# Patient Record
Sex: FEMALE | Race: WHITE | NOT HISPANIC OR LATINO | URBAN - METROPOLITAN AREA
[De-identification: names, ages, dates, MRNs, and addresses within clinical notes are randomized per-mention and may not be internally consistent; named-entity substitution may affect disease eponyms.]

---

## 2019-02-10 ENCOUNTER — HOSPITAL ENCOUNTER (EMERGENCY)
Facility: HOSPITAL | Age: 34
Discharge: HOME | End: 2019-02-10
Attending: EMERGENCY MEDICINE
Payer: OTHER GOVERNMENT

## 2019-02-10 VITALS
WEIGHT: 168 LBS | BODY MASS INDEX: 28.68 KG/M2 | DIASTOLIC BLOOD PRESSURE: 85 MMHG | SYSTOLIC BLOOD PRESSURE: 132 MMHG | TEMPERATURE: 98 F | RESPIRATION RATE: 18 BRPM | HEIGHT: 64 IN | HEART RATE: 90 BPM | OXYGEN SATURATION: 98 %

## 2019-02-10 DIAGNOSIS — R50.9 ACUTE FEBRILE ILLNESS: Primary | ICD-10-CM

## 2019-02-10 DIAGNOSIS — R05.9 COUGH: ICD-10-CM

## 2019-02-10 DIAGNOSIS — J11.1 INFLUENZA-LIKE ILLNESS: ICD-10-CM

## 2019-02-10 LAB
B-HCG UR QL: NEGATIVE
BILIRUB UR QL STRIP: NEGATIVE
CLARITY UR: CLEAR
COLOR UR: YELLOW
CTP QC/QA: YES
CTP QC/QA: YES
DEPRECATED S PYO AG THROAT QL EIA: NEGATIVE
FLUAV AG NPH QL: NEGATIVE
FLUBV AG NPH QL: NEGATIVE
GLUCOSE UR QL STRIP: NEGATIVE
HGB UR QL STRIP: NEGATIVE
KETONES UR QL STRIP: NEGATIVE
LEUKOCYTE ESTERASE UR QL STRIP: NEGATIVE
NITRITE UR QL STRIP: NEGATIVE
PH UR STRIP: 5 [PH] (ref 5–8)
PROT UR QL STRIP: NEGATIVE
SP GR UR STRIP: 1.02 (ref 1–1.03)
URN SPEC COLLECT METH UR: NORMAL
UROBILINOGEN UR STRIP-ACNC: NEGATIVE EU/DL

## 2019-02-10 PROCEDURE — 93005 ELECTROCARDIOGRAM TRACING: CPT

## 2019-02-10 PROCEDURE — 93010 ELECTROCARDIOGRAM REPORT: CPT | Mod: ,,, | Performed by: INTERNAL MEDICINE

## 2019-02-10 PROCEDURE — 93010 EKG 12-LEAD: ICD-10-PCS | Mod: ,,, | Performed by: INTERNAL MEDICINE

## 2019-02-10 PROCEDURE — 81003 URINALYSIS AUTO W/O SCOPE: CPT

## 2019-02-10 PROCEDURE — 81025 URINE PREGNANCY TEST: CPT | Performed by: NURSE PRACTITIONER

## 2019-02-10 PROCEDURE — 99285 EMERGENCY DEPT VISIT HI MDM: CPT

## 2019-02-10 PROCEDURE — 25000003 PHARM REV CODE 250: Performed by: NURSE PRACTITIONER

## 2019-02-10 PROCEDURE — 87081 CULTURE SCREEN ONLY: CPT

## 2019-02-10 PROCEDURE — 87880 STREP A ASSAY W/OPTIC: CPT

## 2019-02-10 RX ORDER — BENZONATATE 100 MG/1
100 CAPSULE ORAL 3 TIMES DAILY PRN
Qty: 15 CAPSULE | Refills: 0 | Status: SHIPPED | OUTPATIENT
Start: 2019-02-10 | End: 2019-02-20

## 2019-02-10 RX ORDER — IBUPROFEN 800 MG/1
800 TABLET ORAL 3 TIMES DAILY
COMMUNITY

## 2019-02-10 RX ORDER — CETIRIZINE HYDROCHLORIDE 10 MG/1
10 TABLET ORAL DAILY
COMMUNITY

## 2019-02-10 RX ORDER — NAPROXEN 500 MG/1
500 TABLET ORAL
Status: COMPLETED | OUTPATIENT
Start: 2019-02-10 | End: 2019-02-10

## 2019-02-10 RX ORDER — PROMETHAZINE HYDROCHLORIDE AND DEXTROMETHORPHAN HYDROBROMIDE 6.25; 15 MG/5ML; MG/5ML
5 SYRUP ORAL NIGHTLY PRN
Qty: 118 ML | Refills: 0 | Status: SHIPPED | OUTPATIENT
Start: 2019-02-10 | End: 2019-02-20

## 2019-02-10 RX ADMIN — NAPROXEN 500 MG: 500 TABLET ORAL at 02:02

## 2019-02-10 NOTE — ED TRIAGE NOTES
Patient reports cough, sneezing, chest discomfort, lethargy, fevers, chills, and generalized body aches x 4 days. Patient states that she has not had Much relief with over the counter cold and flu medications. Denies nausea, vomiting, diarrhea, sore throat, ear pain.

## 2019-02-10 NOTE — DISCHARGE INSTRUCTIONS
Please return to the Emergency Department for any new or worsening symptoms including: fever, chest pain, shortness of breath, loss of consciousness, dizziness, weakness, or any other concerns.     Please follow up with your Primary Care Provider within in the week. If you do not have one, you may contact the one listed on your discharge paperwork or you may also call the [facility] Appointment Desk at [phone] to schedule an appointment with one.     Please take all medication as prescribed. Tessalon Perles as needed for cough during the day.  Phenergan DM as needed for cough at night - Please do not take this medication while working, drinking alcohol, swimming, or while driving/operating heavy machinery. This medication may cause drowsiness, impair judgment, and reduce physical capabilities.    Tylenol or ibuprofen as needed for pain or body aches.  Please stay well-hydrated, drink plenty of fluids.

## 2019-02-10 NOTE — ED PROVIDER NOTES
"Encounter Date: 2/10/2019    SCRIBE #1 NOTE: I, Ayush Bray, am scribing for, and in the presence of,  FORTINO Trammell. I have scribed the following portions of the note - Other sections scribed: HPI and ROS.       History     Chief Complaint   Patient presents with    flu like symptoms     Patient reports cough, sneezing, chest discomfort, lethargy, fevers, chills, and generalized body aches x 4 days.     CC: Flu like symptoms    HPI: This 33 y.o F with no pertinent PMHx presents to the ED c/o acute onset of sneezing, severe (8/10) generalized body aches, fatigue, non-productive cough and intermittent fever x4 days. The pt's son also presents with similar symptoms. She also reports "sharp" chest pain only when coughing. She denies fever, chills, diaphoresis, rhinorrhea, nasal congestion, nausea, emesis, diarrhea, abdominal pain, back pain, dysuria, difficulty urinating, SOB and rash. She attempted tx with multiple OTC medications.       The history is provided by the patient. No  was used.     Review of patient's allergies indicates:  No Known Allergies  History reviewed. No pertinent past medical history.  Past Surgical History:   Procedure Laterality Date     SECTION       History reviewed. No pertinent family history.  Social History     Tobacco Use    Smoking status: Never Smoker   Substance Use Topics    Alcohol use: No    Drug use: No     Review of Systems   Constitutional: Positive for fever. Negative for chills and diaphoresis.   HENT: Positive for rhinorrhea and sneezing. Negative for sore throat and trouble swallowing.    Eyes: Negative for redness.   Respiratory: Positive for cough. Negative for shortness of breath.    Cardiovascular: Positive for chest pain ("sharp", only when coughing).   Gastrointestinal: Negative for abdominal pain, diarrhea, nausea and vomiting.   Genitourinary: Positive for frequency (reports drinking more fluids). Negative for dysuria and " urgency.   Musculoskeletal: Positive for myalgias. Negative for back pain and neck pain.   Skin: Negative for rash.   Neurological: Negative for syncope and weakness.   Psychiatric/Behavioral: The patient is not nervous/anxious.        Physical Exam     Initial Vitals [02/10/19 1307]   BP Pulse Resp Temp SpO2   133/83 92 18 98.3 °F (36.8 °C) 98 %      MAP       --         Physical Exam    Nursing note and vitals reviewed.  Constitutional: She appears well-developed and well-nourished. She is not diaphoretic. She is cooperative.  Non-toxic appearance. She does not have a sickly appearance. She does not appear ill. No distress.   HENT:   Head: Normocephalic and atraumatic.   Right Ear: Tympanic membrane and external ear normal. Tympanic membrane is not erythematous.   Left Ear: Tympanic membrane and external ear normal. Tympanic membrane is not erythematous.   Nose: Mucosal edema and rhinorrhea present.   Mouth/Throat: Uvula is midline and mucous membranes are normal. No trismus in the jaw. No oropharyngeal exudate, posterior oropharyngeal edema, posterior oropharyngeal erythema or tonsillar abscesses.   Posterior pharyngeal cobblestoning with no erythema.   Eyes: Conjunctivae and EOM are normal.   Neck: Normal range of motion and phonation normal. Neck supple. No tracheal deviation and normal range of motion present. No neck rigidity.   Cardiovascular: Normal rate, regular rhythm and intact distal pulses.   Pulses:       Radial pulses are 2+ on the right side, and 2+ on the left side.   Pulmonary/Chest: Effort normal and breath sounds normal. No stridor. No tachypnea and no bradypnea. No respiratory distress. She has no wheezes. She has no rhonchi. She has no rales. She exhibits tenderness.   Abdominal: She exhibits no distension.   Musculoskeletal: Normal range of motion.   Lymphadenopathy:        Right cervical: No superficial cervical adenopathy present.       Left cervical: No superficial cervical adenopathy  present.   Neurological: She is alert and oriented to person, place, and time. She has normal strength. No sensory deficit. Coordination and gait normal. GCS eye subscore is 4. GCS verbal subscore is 5. GCS motor subscore is 6.   Skin: Skin is warm, dry and intact. Capillary refill takes less than 2 seconds. No rash noted. No cyanosis or erythema. Nails show no clubbing.   Psychiatric: She has a normal mood and affect. Her behavior is normal. Judgment and thought content normal.         ED Course   Procedures  Labs Reviewed   THROAT SCREEN, RAPID   CULTURE, STREP A,  THROAT   URINALYSIS, REFLEX TO URINE CULTURE    Narrative:     Preferred Collection Type->Urine, Clean Catch   POCT URINE PREGNANCY   POCT INFLUENZA A/B     EKG Readings: (Independently Interpreted)   Initial Reading: No STEMI. Rhythm: Normal Sinus Rhythm. Ectopy: No Ectopy. Conduction: Normal. ST Segments: Normal ST Segments. T Waves Flipped: III. Axis: Normal. Clinical Impression: Normal Sinus Rhythm       Imaging Results          X-Ray Chest PA And Lateral (Final result)  Result time 02/10/19 14:42:09    Final result by Malvin North MD (02/10/19 14:42:09)                 Impression:      1. No acute cardiopulmonary process.      Electronically signed by: Malvin North MD  Date:    02/10/2019  Time:    14:42             Narrative:    EXAMINATION:  XR CHEST PA AND LATERAL    CLINICAL HISTORY:  Cough    TECHNIQUE:  PA and lateral views of the chest were performed.    COMPARISON:  None    FINDINGS:  The cardiomediastinal silhouette is not enlarged.  There is no pleural effusion.  The trachea is midline.  The lungs are symmetrically expanded bilaterally without evidence of acute parenchymal process. No large focal consolidation seen.  There is no pneumothorax.  The osseous structures are unremarkable.                                       APC / Resident Notes:   This is an evaluation of a 33 y.o. female that presents to the Emergency Department  for fever, cough, nasal congestion, and CP only with cough. The patient is a non-toxic, afebrile, and well appearing female. On physical exam ears are without infection. Pharynx with no erythema and no exudates.  There is posterior pharyngeal cobblestoning. Appears well hydrated with moist mucus membranes. Neck soft and supple with no meningeal signs; without cervical lymphadenopathy. Breath sounds are clear and equal bilaterally. No tachypnea or respiratory distress with room air pulse ox of 98% and no evidence of hypoxia or cyanosis. There is reproducible anterior chest wall pain to palpation. Vital Signs Are Reassuring. RESULTS:  UPT:  Negative  Influenza: Negative.  Rapid strep:  Negative Chest x-ray:  Without evidence of pneumothorax, pneumonia, or pleural effusion.  Urinalysis:  Negative for infection.  EKG with no evidence of acute cardiac ischemia or lethal arrhythmia. Patient is outside the antiviral treatment window.  Patient's son, also a patient,  is flu A positive.    My overall impression is acute febrile illness secondary to influenza like illness. I considered, but at this time, do not suspect OM, OE, strep pharyngitis, meningitis, pneumonia, significant dehydration, or acute bacterial sinusitis.  I suspect chest pain likely from cough, no evidence to suggest cardiac etiology.    D/C Meds:  Tessalon, Phenergan DM. D/C Information: Supportive care, Tylenol/Ibuprofen PRN, Hydration. The diagnosis, treatment plan, instructions for follow-up and reevaluation with PCP as well as ED return precautions were discussed and understanding was verbalized. All questions or concerns have been addressed.  SOFIA Weiss, FNP-C       Scribe Attestation:   Scribe #1: I performed the above scribed service and the documentation accurately describes the services I performed. I attest to the accuracy of the note.    Attending Attestation:           Physician Attestation for Scribe:  Physician Attestation Statement for  Scribe #1: I, FORTINO Trammell, reviewed documentation, as scribed by Ayush Bray in my presence, and it is both accurate and complete.                    Clinical Impression:   The primary encounter diagnosis was Acute febrile illness. Diagnoses of Cough and Influenza-like illness were also pertinent to this visit.      Disposition:   Disposition: Discharged  Condition: Stable                        FORTINO Wadsworth  02/10/19 1554

## 2019-02-13 ENCOUNTER — TELEPHONE (OUTPATIENT)
Dept: EMERGENCY MEDICINE | Facility: HOSPITAL | Age: 34
End: 2019-02-13

## 2019-02-13 LAB
BACTERIA THROAT CULT: NORMAL
BACTERIA THROAT CULT: NORMAL

## 2019-02-13 RX ORDER — AMOXICILLIN 500 MG/1
500 CAPSULE ORAL 3 TIMES DAILY
Qty: 30 CAPSULE | Refills: 0 | Status: SHIPPED | OUTPATIENT
Start: 2019-02-13 | End: 2019-02-23

## 2019-02-13 RX ORDER — AMOXICILLIN 500 MG/1
500 CAPSULE ORAL 3 TIMES DAILY
Qty: 30 CAPSULE | Refills: 0 | Status: SHIPPED | OUTPATIENT
Start: 2019-02-13 | End: 2019-02-13 | Stop reason: ALTCHOICE